# Patient Record
(demographics unavailable — no encounter records)

---

## 2025-07-25 NOTE — IMAGING
[Left] : left elbow [FreeTextEntry1] : 3 views (AP, Lateral and Oblique) obtained and interpreted on 07/25/2025. open growth plates. There are no fractures, subluxations or dislocations. No significant abnormalities are seen.

## 2025-07-25 NOTE — PHYSICAL EXAM
[Right] : right shoulder [de-identified] : external rotation at 90 degrees of abduction 100 degrees [TWNoteComboBox5] : internal rotation at 90 degrees of abduction 90 degrees [Left] : left elbow [NL (150)] : flexion 150 degrees [NL (0)] : extension 0 degrees [NL (90)] : supination 90 degrees [] : light touch intact

## 2025-07-25 NOTE — DISCUSSION/SUMMARY
[de-identified] : 12m with left shoulder GIRD.  The patient was advised of the diagnosis. The natural history of the pathology was explained in full to the patient in layman's terms. All questions were answered.   1) patient is instructed to start physical therapy to work on restoring ROM  2) discussed the importance of rest 3) no throwing, okay for hitting 4) cryotherapy 5) rtc 2 weeks for re-eval   Entered by Janine Vega acting as scribe. Dr. Nava- The documentation recorded by the scribe accurately reflects the service I personally performed and the decisions made by me.